# Patient Record
Sex: MALE | Race: OTHER | HISPANIC OR LATINO | URBAN - METROPOLITAN AREA
[De-identification: names, ages, dates, MRNs, and addresses within clinical notes are randomized per-mention and may not be internally consistent; named-entity substitution may affect disease eponyms.]

---

## 2017-07-22 ENCOUNTER — INPATIENT (INPATIENT)
Facility: HOSPITAL | Age: 47
LOS: 1 days | Discharge: HOME | DRG: 313 | End: 2017-07-24
Attending: INTERNAL MEDICINE | Admitting: INTERNAL MEDICINE
Payer: COMMERCIAL

## 2017-07-22 VITALS
OXYGEN SATURATION: 98 % | TEMPERATURE: 98 F | HEART RATE: 107 BPM | WEIGHT: 134.92 LBS | HEIGHT: 66 IN | SYSTOLIC BLOOD PRESSURE: 132 MMHG | RESPIRATION RATE: 20 BRPM | DIASTOLIC BLOOD PRESSURE: 92 MMHG

## 2017-07-22 DIAGNOSIS — R07.9 CHEST PAIN, UNSPECIFIED: ICD-10-CM

## 2017-07-22 DIAGNOSIS — Z98.52 VASECTOMY STATUS: Chronic | ICD-10-CM

## 2017-07-22 DIAGNOSIS — Z29.9 ENCOUNTER FOR PROPHYLACTIC MEASURES, UNSPECIFIED: ICD-10-CM

## 2017-07-22 LAB
ACETONE SERPL-MCNC: NEGATIVE — SIGNIFICANT CHANGE UP
ALBUMIN SERPL ELPH-MCNC: 3.6 G/DL — SIGNIFICANT CHANGE UP (ref 3.5–5)
ALP SERPL-CCNC: 89 U/L — SIGNIFICANT CHANGE UP (ref 40–120)
ALT FLD-CCNC: 33 U/L DA — SIGNIFICANT CHANGE UP (ref 10–60)
ANION GAP SERPL CALC-SCNC: 7 MMOL/L — SIGNIFICANT CHANGE UP (ref 5–17)
APPEARANCE UR: CLEAR — SIGNIFICANT CHANGE UP
APTT BLD: 29.3 SEC — SIGNIFICANT CHANGE UP (ref 27.5–37.4)
AST SERPL-CCNC: 24 U/L — SIGNIFICANT CHANGE UP (ref 10–40)
BASOPHILS # BLD AUTO: 0.1 K/UL — SIGNIFICANT CHANGE UP (ref 0–0.2)
BASOPHILS NFR BLD AUTO: 1.5 % — SIGNIFICANT CHANGE UP (ref 0–2)
BILIRUB SERPL-MCNC: 0.5 MG/DL — SIGNIFICANT CHANGE UP (ref 0.2–1.2)
BILIRUB UR-MCNC: NEGATIVE — SIGNIFICANT CHANGE UP
BUN SERPL-MCNC: 10 MG/DL — SIGNIFICANT CHANGE UP (ref 7–18)
CALCIUM SERPL-MCNC: 8.7 MG/DL — SIGNIFICANT CHANGE UP (ref 8.4–10.5)
CHLORIDE SERPL-SCNC: 105 MMOL/L — SIGNIFICANT CHANGE UP (ref 96–108)
CHOLEST SERPL-MCNC: 184 MG/DL — SIGNIFICANT CHANGE UP (ref 10–199)
CK MB BLD-MCNC: 0.7 % — SIGNIFICANT CHANGE UP (ref 0–3.5)
CK MB BLD-MCNC: <0.7 % — SIGNIFICANT CHANGE UP (ref 0–3.5)
CK MB CFR SERPL CALC: 1.1 NG/ML — SIGNIFICANT CHANGE UP (ref 0–3.6)
CK MB CFR SERPL CALC: <1 NG/ML — SIGNIFICANT CHANGE UP (ref 0–3.6)
CK SERPL-CCNC: 140 U/L — SIGNIFICANT CHANGE UP (ref 35–232)
CK SERPL-CCNC: 154 U/L — SIGNIFICANT CHANGE UP (ref 35–232)
CO2 SERPL-SCNC: 28 MMOL/L — SIGNIFICANT CHANGE UP (ref 22–31)
COLOR SPEC: YELLOW — SIGNIFICANT CHANGE UP
CREAT SERPL-MCNC: 1.12 MG/DL — SIGNIFICANT CHANGE UP (ref 0.5–1.3)
D DIMER BLD IA.RAPID-MCNC: <150 NG/ML DDU — SIGNIFICANT CHANGE UP
DIFF PNL FLD: NEGATIVE — SIGNIFICANT CHANGE UP
EOSINOPHIL # BLD AUTO: 0.3 K/UL — SIGNIFICANT CHANGE UP (ref 0–0.5)
EOSINOPHIL NFR BLD AUTO: 3.6 % — SIGNIFICANT CHANGE UP (ref 0–6)
GLUCOSE SERPL-MCNC: 98 MG/DL — SIGNIFICANT CHANGE UP (ref 70–99)
GLUCOSE UR QL: NEGATIVE — SIGNIFICANT CHANGE UP
HCT VFR BLD CALC: 54.2 % — HIGH (ref 39–50)
HDLC SERPL-MCNC: 51 MG/DL — SIGNIFICANT CHANGE UP (ref 40–125)
HGB BLD-MCNC: 17.2 G/DL — HIGH (ref 13–17)
INR BLD: 0.92 RATIO — SIGNIFICANT CHANGE UP (ref 0.88–1.16)
KETONES UR-MCNC: NEGATIVE — SIGNIFICANT CHANGE UP
LEUKOCYTE ESTERASE UR-ACNC: NEGATIVE — SIGNIFICANT CHANGE UP
LIDOCAIN IGE QN: 200 U/L — SIGNIFICANT CHANGE UP (ref 73–393)
LIPID PNL WITH DIRECT LDL SERPL: 75 MG/DL — SIGNIFICANT CHANGE UP
LYMPHOCYTES # BLD AUTO: 2.6 K/UL — SIGNIFICANT CHANGE UP (ref 1–3.3)
LYMPHOCYTES # BLD AUTO: 28 % — SIGNIFICANT CHANGE UP (ref 13–44)
MAGNESIUM SERPL-MCNC: 2.1 MG/DL — SIGNIFICANT CHANGE UP (ref 1.6–2.6)
MCHC RBC-ENTMCNC: 28.4 PG — SIGNIFICANT CHANGE UP (ref 27–34)
MCHC RBC-ENTMCNC: 31.7 GM/DL — LOW (ref 32–36)
MCV RBC AUTO: 89.6 FL — SIGNIFICANT CHANGE UP (ref 80–100)
MONOCYTES # BLD AUTO: 0.8 K/UL — SIGNIFICANT CHANGE UP (ref 0–0.9)
MONOCYTES NFR BLD AUTO: 8.4 % — SIGNIFICANT CHANGE UP (ref 2–14)
NEUTROPHILS # BLD AUTO: 5.4 K/UL — SIGNIFICANT CHANGE UP (ref 1.8–7.4)
NEUTROPHILS NFR BLD AUTO: 58.4 % — SIGNIFICANT CHANGE UP (ref 43–77)
NITRITE UR-MCNC: NEGATIVE — SIGNIFICANT CHANGE UP
NT-PROBNP SERPL-SCNC: 42 PG/ML — SIGNIFICANT CHANGE UP (ref 0–125)
PH UR: 6 — SIGNIFICANT CHANGE UP (ref 5–8)
PLATELET # BLD AUTO: 211 K/UL — SIGNIFICANT CHANGE UP (ref 150–400)
POTASSIUM SERPL-MCNC: 4.3 MMOL/L — SIGNIFICANT CHANGE UP (ref 3.5–5.3)
POTASSIUM SERPL-SCNC: 4.3 MMOL/L — SIGNIFICANT CHANGE UP (ref 3.5–5.3)
PROT SERPL-MCNC: 7.1 G/DL — SIGNIFICANT CHANGE UP (ref 6–8.3)
PROT UR-MCNC: NEGATIVE — SIGNIFICANT CHANGE UP
PROTHROM AB SERPL-ACNC: 10 SEC — SIGNIFICANT CHANGE UP (ref 9.8–12.7)
RBC # BLD: 6.04 M/UL — HIGH (ref 4.2–5.8)
RBC # FLD: 12 % — SIGNIFICANT CHANGE UP (ref 10.3–14.5)
SODIUM SERPL-SCNC: 140 MMOL/L — SIGNIFICANT CHANGE UP (ref 135–145)
SP GR SPEC: 1.01 — SIGNIFICANT CHANGE UP (ref 1.01–1.02)
TOTAL CHOLESTEROL/HDL RATIO MEASUREMENT: 3.6 RATIO — SIGNIFICANT CHANGE UP (ref 3.4–9.6)
TRIGL SERPL-MCNC: 291 MG/DL — HIGH (ref 10–149)
TROPONIN I SERPL-MCNC: <0.015 NG/ML — SIGNIFICANT CHANGE UP (ref 0–0.04)
TROPONIN I SERPL-MCNC: <0.015 NG/ML — SIGNIFICANT CHANGE UP (ref 0–0.04)
TSH SERPL-MCNC: 0.63 UU/ML — SIGNIFICANT CHANGE UP (ref 0.34–4.82)
UROBILINOGEN FLD QL: NEGATIVE — SIGNIFICANT CHANGE UP
WBC # BLD: 9.2 K/UL — SIGNIFICANT CHANGE UP (ref 3.8–10.5)
WBC # FLD AUTO: 9.2 K/UL — SIGNIFICANT CHANGE UP (ref 3.8–10.5)

## 2017-07-22 PROCEDURE — 99285 EMERGENCY DEPT VISIT HI MDM: CPT

## 2017-07-22 PROCEDURE — 71010: CPT | Mod: 26

## 2017-07-22 RX ORDER — SODIUM CHLORIDE 9 MG/ML
3 INJECTION INTRAMUSCULAR; INTRAVENOUS; SUBCUTANEOUS ONCE
Qty: 0 | Refills: 0 | Status: COMPLETED | OUTPATIENT
Start: 2017-07-22 | End: 2017-07-22

## 2017-07-22 RX ORDER — ASPIRIN/CALCIUM CARB/MAGNESIUM 324 MG
162 TABLET ORAL ONCE
Qty: 0 | Refills: 0 | Status: COMPLETED | OUTPATIENT
Start: 2017-07-22 | End: 2017-07-22

## 2017-07-22 RX ORDER — METOPROLOL TARTRATE 50 MG
25 TABLET ORAL
Qty: 0 | Refills: 0 | Status: DISCONTINUED | OUTPATIENT
Start: 2017-07-22 | End: 2017-07-24

## 2017-07-22 RX ORDER — SODIUM CHLORIDE 9 MG/ML
1000 INJECTION INTRAMUSCULAR; INTRAVENOUS; SUBCUTANEOUS
Qty: 0 | Refills: 0 | Status: DISCONTINUED | OUTPATIENT
Start: 2017-07-22 | End: 2017-07-24

## 2017-07-22 RX ORDER — TRAMADOL HYDROCHLORIDE 50 MG/1
25 TABLET ORAL THREE TIMES A DAY
Qty: 0 | Refills: 0 | Status: DISCONTINUED | OUTPATIENT
Start: 2017-07-22 | End: 2017-07-24

## 2017-07-22 RX ORDER — ATORVASTATIN CALCIUM 80 MG/1
40 TABLET, FILM COATED ORAL AT BEDTIME
Qty: 0 | Refills: 0 | Status: DISCONTINUED | OUTPATIENT
Start: 2017-07-22 | End: 2017-07-24

## 2017-07-22 RX ORDER — ASPIRIN/CALCIUM CARB/MAGNESIUM 324 MG
81 TABLET ORAL DAILY
Qty: 0 | Refills: 0 | Status: DISCONTINUED | OUTPATIENT
Start: 2017-07-22 | End: 2017-07-24

## 2017-07-22 RX ORDER — SODIUM CHLORIDE 9 MG/ML
1000 INJECTION INTRAMUSCULAR; INTRAVENOUS; SUBCUTANEOUS
Qty: 0 | Refills: 0 | Status: DISCONTINUED | OUTPATIENT
Start: 2017-07-22 | End: 2017-07-22

## 2017-07-22 RX ORDER — NICOTINE POLACRILEX 2 MG
1 GUM BUCCAL DAILY
Qty: 0 | Refills: 0 | Status: DISCONTINUED | OUTPATIENT
Start: 2017-07-22 | End: 2017-07-24

## 2017-07-22 RX ORDER — ACETAMINOPHEN 500 MG
650 TABLET ORAL EVERY 6 HOURS
Qty: 0 | Refills: 0 | Status: DISCONTINUED | OUTPATIENT
Start: 2017-07-22 | End: 2017-07-24

## 2017-07-22 RX ORDER — PANTOPRAZOLE SODIUM 20 MG/1
40 TABLET, DELAYED RELEASE ORAL
Qty: 0 | Refills: 0 | Status: DISCONTINUED | OUTPATIENT
Start: 2017-07-22 | End: 2017-07-24

## 2017-07-22 RX ADMIN — SODIUM CHLORIDE 3 MILLILITER(S): 9 INJECTION INTRAMUSCULAR; INTRAVENOUS; SUBCUTANEOUS at 14:43

## 2017-07-22 RX ADMIN — Medication 25 MILLIGRAM(S): at 20:39

## 2017-07-22 RX ADMIN — Medication 162 MILLIGRAM(S): at 14:43

## 2017-07-22 RX ADMIN — ATORVASTATIN CALCIUM 40 MILLIGRAM(S): 80 TABLET, FILM COATED ORAL at 20:40

## 2017-07-22 RX ADMIN — SODIUM CHLORIDE 125 MILLILITER(S): 9 INJECTION INTRAMUSCULAR; INTRAVENOUS; SUBCUTANEOUS at 18:14

## 2017-07-22 NOTE — H&P ADULT - ASSESSMENT
47 y.o. male from home, lives with wife and daughter, has never seen a physician in the past and has no PNHx, PSHx of vasectomy, c/o on & off Lt sided chest pressure since   patient has stable labs and vitals in the ED, EKG: NSR, without EKG changes, T1 and D-dimer negative, ProBNp WNL.  patient has never seen a doctor or had a cardiac work-up in the past and was worried this time about the pain being of cardiac etiology and hence came to the ED. patient is being admitted for chest pain, patient being a smoker, is at a risk for ACS and hence will need monitoring.

## 2017-07-22 NOTE — H&P ADULT - NEGATIVE GASTROINTESTINAL SYMPTOMS
no jaundice/no nausea/no constipation/no hematochezia/no steatorrhea/no change in bowel habits/no diarrhea/no melena/no abdominal pain/no vomiting/no flatulence/no hiccoughs
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
## 2017-07-22 NOTE — H&P ADULT - NEGATIVE ENMT SYMPTOMS
no post-nasal discharge/no ear pain/no vertigo/no nasal discharge/no nasal congestion/no nasal obstruction/no gum bleeding/no throat pain/no sinus symptoms/no dry mouth/no recurrent cold sores/no tinnitus/no nose bleeds/no abnormal taste sensation/no dysphagia/no hearing difficulty

## 2017-07-22 NOTE — H&P ADULT - NEUROLOGICAL DETAILS
deep reflexes intact/cranial nerves intact/alert and oriented x 3/sensation intact/responds to pain/responds to verbal commands

## 2017-07-22 NOTE — H&P ADULT - RS GEN PE MLT RESP DETAILS PC
no chest wall tenderness/airway patent/no subcutaneous emphysema/respirations non-labored/airway obstructed/normal/good air movement/no rales/no wheezes/breath sounds equal/no intercostal retractions/no rhonchi/clear to auscultation bilaterally

## 2017-07-22 NOTE — H&P ADULT - GASTROINTESTINAL DETAILS
no rebound tenderness/no masses palpable/no guarding/nontender/no distention/no bruit/no organomegaly/normal/soft/no rigidity/bowel sounds normal

## 2017-07-22 NOTE — ED ADULT NURSE NOTE - ED STAT RN HANDOFF DETAILS 2
received  pt.in bed   at 1910  pt.is  alert and  oriented x3.denies pain.on CM  with  NSR. transfer to  509A report given  to darion davis.not   in  distress

## 2017-07-22 NOTE — H&P ADULT - NEGATIVE CARDIOVASCULAR SYMPTOMS
no paroxysmal nocturnal dyspnea/no palpitations/no peripheral edema/no claudication/no orthopnea/no dyspnea on exertion

## 2017-07-22 NOTE — H&P ADULT - NEGATIVE GENERAL GENITOURINARY SYMPTOMS
normal urinary frequency/no nocturia/no urine discoloration/no incontinence/normal libido/no urinary hesitancy/no flank pain L/no flank pain R/no dysuria/no renal colic/no bladder infections/no hematuria/no gas in urine

## 2017-07-22 NOTE — H&P ADULT - NEGATIVE OPHTHALMOLOGIC SYMPTOMS
no discharge L/no lacrimation R/no loss of vision L/no lacrimation L/no irritation L/no blurred vision R/no loss of vision R/no scleral injection R/no photophobia/no pain R/no irritation R/no blurred vision L/no discharge R/no pain L/no scleral injection L/no diplopia

## 2017-07-22 NOTE — ED PROVIDER NOTE - OBJECTIVE STATEMENT
47 y.o. male has never seen a physician in the past, c/o on & off Lt sided chest pressure since yest., also "pinching" sensation nonradiating, no associated symptoms, pt with problem sleeping for past few mos., no fever, coughing, recent travelling

## 2017-07-22 NOTE — H&P ADULT - NEGATIVE GENERAL SYMPTOMS
no polydipsia/no fever/no polyuria/no weight gain/no malaise/no sweating/no polyphagia/no fatigue/no anorexia/no weight loss/no chills

## 2017-07-22 NOTE — H&P ADULT - NEGATIVE SKIN SYMPTOMS
no itching/no hair loss/no change in size/color of mole/no rash/no pitted nails/no tumor/no dryness/no brittle nails

## 2017-07-22 NOTE — H&P ADULT - PROBLEM SELECTOR PLAN 1
patient with chest pain, non exertional, intermittent on left side of chest , however patient is a current active smoker and ACS needs to be ruled out  More suspicion for MSK chest pain  KATHIA score: 0  Monitor on TELE, ACS protocol, ASA, Statin BB  Will obtain TTE patient with chest pain, non exertional, intermittent on left side of chest , however patient is a current active smoker and ACS needs to be ruled out  More suspicion for MSK chest pain  KATHAI score: 0  Monitor on TELE, ACS protocol, ASA, Statin BB  Will obtain TTE  Cardiology: Dr Ramon patient with chest pain, non exertional, intermittent on left side of chest , however patient is a current active smoker and ACS needs to be ruled out  More suspicion for MSK chest pain  KATHIA score: 0  Monitor on TELE, ACS protocol, ASA, Statin BB  Trend troponins, T1 negative, F/up T2 and T3  Will obtain TTE  Cardiology: Dr Ramon

## 2017-07-22 NOTE — H&P ADULT - NEGATIVE MUSCULOSKELETAL SYMPTOMS
no leg pain L/no arthritis/no muscle weakness/no myalgia/no leg pain R/no joint swelling/no muscle cramps/no back pain/no arthralgia/no arm pain R/no neck pain/no arm pain L/no stiffness

## 2017-07-22 NOTE — H&P ADULT - HISTORY OF PRESENT ILLNESS
47 y.o. male from home, lives with wife and daughter, has never seen a physician in the past and has no PNHx, PSHx of vasectomy, c/o on & off Lt sided chest pressure since yesterday, also "pinching" sensation non radiating, no associated symptoms. patient describes the pain as pressure like, left sided, with ocassional pinching sensation, intermittent, with no exacerbating or relieving factors, pain happens at rest too, even while watching TV. 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 However patient admits that the pain is sometimes worse while stretching his upper body or deep inspiration, denies association with food, denies any symptoms of GERD. Says that he has been unable to sleep adequately for a past few months, however denies any stress at home or work.  Patient is a current smoker, 1 Pack a week for 20 years, drinks beer frequently, specially 10-20 beers on weekends  NKDA  family Hx: CVA in Father and HTN in mother

## 2017-07-22 NOTE — ED PROVIDER NOTE - MEDICAL DECISION MAKING DETAILS
pt with chest pain, h/o smoking, never saw a physician, concern for ACS, will get labs, Trop, admission

## 2017-07-23 LAB
ANION GAP SERPL CALC-SCNC: 7 MMOL/L — SIGNIFICANT CHANGE UP (ref 5–17)
BUN SERPL-MCNC: 7 MG/DL — SIGNIFICANT CHANGE UP (ref 7–18)
CALCIUM SERPL-MCNC: 8.2 MG/DL — LOW (ref 8.4–10.5)
CHLORIDE SERPL-SCNC: 109 MMOL/L — HIGH (ref 96–108)
CK MB CFR SERPL CALC: <1 NG/ML — SIGNIFICANT CHANGE UP (ref 0–3.6)
CK SERPL-CCNC: 135 U/L — SIGNIFICANT CHANGE UP (ref 35–232)
CO2 SERPL-SCNC: 28 MMOL/L — SIGNIFICANT CHANGE UP (ref 22–31)
CREAT SERPL-MCNC: 0.85 MG/DL — SIGNIFICANT CHANGE UP (ref 0.5–1.3)
FOLATE SERPL-MCNC: 12.4 NG/ML — SIGNIFICANT CHANGE UP (ref 4.8–24.2)
GLUCOSE SERPL-MCNC: 89 MG/DL — SIGNIFICANT CHANGE UP (ref 70–99)
HBA1C BLD-MCNC: 5.4 % — SIGNIFICANT CHANGE UP (ref 4–5.6)
HCT VFR BLD CALC: 49.3 % — SIGNIFICANT CHANGE UP (ref 39–50)
HGB BLD-MCNC: 16.3 G/DL — SIGNIFICANT CHANGE UP (ref 13–17)
MAGNESIUM SERPL-MCNC: 2.1 MG/DL — SIGNIFICANT CHANGE UP (ref 1.6–2.6)
MCHC RBC-ENTMCNC: 29.3 PG — SIGNIFICANT CHANGE UP (ref 27–34)
MCHC RBC-ENTMCNC: 33.1 GM/DL — SIGNIFICANT CHANGE UP (ref 32–36)
MCV RBC AUTO: 88.7 FL — SIGNIFICANT CHANGE UP (ref 80–100)
PHOSPHATE SERPL-MCNC: 2.7 MG/DL — SIGNIFICANT CHANGE UP (ref 2.5–4.5)
PLATELET # BLD AUTO: 219 K/UL — SIGNIFICANT CHANGE UP (ref 150–400)
POTASSIUM SERPL-MCNC: 4.5 MMOL/L — SIGNIFICANT CHANGE UP (ref 3.5–5.3)
POTASSIUM SERPL-SCNC: 4.5 MMOL/L — SIGNIFICANT CHANGE UP (ref 3.5–5.3)
RBC # BLD: 5.56 M/UL — SIGNIFICANT CHANGE UP (ref 4.2–5.8)
RBC # FLD: 12.7 % — SIGNIFICANT CHANGE UP (ref 10.3–14.5)
SODIUM SERPL-SCNC: 144 MMOL/L — SIGNIFICANT CHANGE UP (ref 135–145)
TROPONIN I SERPL-MCNC: <0.015 NG/ML — SIGNIFICANT CHANGE UP (ref 0–0.04)
TROPONIN I SERPL-MCNC: <0.015 NG/ML — SIGNIFICANT CHANGE UP (ref 0–0.04)
VIT B12 SERPL-MCNC: 482 PG/ML — SIGNIFICANT CHANGE UP (ref 243–894)
WBC # BLD: 10.6 K/UL — HIGH (ref 3.8–10.5)
WBC # FLD AUTO: 10.6 K/UL — HIGH (ref 3.8–10.5)

## 2017-07-23 RX ADMIN — ATORVASTATIN CALCIUM 40 MILLIGRAM(S): 80 TABLET, FILM COATED ORAL at 21:47

## 2017-07-23 RX ADMIN — SODIUM CHLORIDE 75 MILLILITER(S): 9 INJECTION INTRAMUSCULAR; INTRAVENOUS; SUBCUTANEOUS at 16:50

## 2017-07-23 RX ADMIN — PANTOPRAZOLE SODIUM 40 MILLIGRAM(S): 20 TABLET, DELAYED RELEASE ORAL at 06:02

## 2017-07-23 RX ADMIN — Medication 1 PATCH: at 12:27

## 2017-07-23 RX ADMIN — Medication 81 MILLIGRAM(S): at 12:27

## 2017-07-23 RX ADMIN — Medication 25 MILLIGRAM(S): at 17:00

## 2017-07-23 NOTE — CONSULT NOTE ADULT - SUBJECTIVE AND OBJECTIVE BOX
Time of visit:1400    CHIEF COMPLAINT: Patient is a 47y old  Male who presents with a chief complaint of chest pain (2017 19:37)      HPI:  47 y.o. male from home, lives with wife and daughter, has never seen a physician in the past and has no PNHx, PSHx of vasectomy, c/o on & off Lt sided chest pressure since yesterday, also "pinching" sensation non radiating, no associated symptoms. patient describes the pain as pressure like, left sided, with ocassional pinching sensation, intermittent, with no exacerbating or relieving factors, pain happens at rest too, even while watching TV. 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 However patient admits that the pain is sometimes worse while stretching his upper body or deep inspiration, denies association with food, denies any symptoms of GERD. Says that he has been unable to sleep adequately for a past few months, however denies any stress at home or work.  Patient is a current smoker, 1 Pack a week for 20 years, drinks beer frequently, specially 10-20 beers on weekends  NKDA  family Hx: CVA in Father and HTN in mother (2017 19:37)   Patient seen and examined.     PAST MEDICAL & SURGICAL HISTORY:  No pertinent past medical history  H/O vasectomy      Allergies    No Known Allergies    Intolerances        MEDICATIONS  (STANDING):  nicotine -  14 mG/24Hr(s) Patch 1 patch Transdermal daily  aspirin enteric coated 81 milliGRAM(s) Oral daily  atorvastatin 40 milliGRAM(s) Oral at bedtime  metoprolol 25 milliGRAM(s) Oral two times a day  pantoprazole    Tablet 40 milliGRAM(s) Oral before breakfast  sodium chloride 0.9%. 1000 milliLiter(s) (75 mL/Hr) IV Continuous <Continuous>      MEDICATIONS  (PRN):  acetaminophen   Tablet. 650 milliGRAM(s) Oral every 6 hours PRN Mild Pain (1 - 3)  traMADol 25 milliGRAM(s) Oral three times a day PRN Moderate Pain (4 - 6)   Medications up to date at time of exam.    Medications up to date at time of exam.    FAMILY HISTORY:  No pertinent family history in first degree relatives      SOCIAL HISTORY  Smoking History: [ x  ] smoking/smoke exposure, 1/2 pack per day. second smoke expose for father and mother  Living Condition: [   ] apartment, [ x  ] private house  Work History:   Travel History: denies recent travel  Illicit Substance Use: denies  Alcohol Use: denies    REVIEW OF SYSTEMS:    CONSTITUTIONAL:  denies fevers, chills, sweats, weight loss    HEENT:  denies diplopia or blurred vision, sore throat or runny nose.    CARDIOVASCULAR:  denies pressure, squeezing, tightness, or heaviness about the chest; no palpitations.    RESPIRATORY:  denies SOB, cough, MEHTA, wheezing.    GASTROINTESTINAL:  denies abdominal pain, nausea, vomiting or diarrhea.    GENITOURINARY: denies dysuria, frequency or urgency.    NEUROLOGIC:  denies numbness, tingling, seizures or weakness.    PSYCHIATRIC:  denies disorder of thought or mood.    MSK: denies swelling, redness      PHYSICAL EXAMINATION:    GENERAL: The patient is a well-developed, well-nourished, in no apparent distress.     Vital Signs Last 24 Hrs  T(C): 37.1 (2017 16:07), Max: 37.2 (2017 11:37)  T(F): 98.7 (2017 16:07), Max: 98.9 (2017 11:37)  HR: 89 (2017 16:07) (55 - 89)  BP: 120/79 (2017 16:07) (109/78 - 129/78)  BP(mean): --  RR: 18 (2017 16:07) (17 - 20)  SpO2: 99% (2017 16:07) (98% - 100%)   (if applicable)    Chest Tube (if applicable)    HEENT: Head is normocephalic and atraumatic. Extraocular muscles are intact. Mucous membranes are moist.     NECK: Supple, no palpable adenopathy.    LUNGS: Clear to auscultation, no wheezing, rales, or rhonchi.    HEART: Regular rate and rhythm without murmur.    ABDOMEN: Soft, nontender, and nondistended.  No hepatosplenomegaly is noted.    EXTREMITIES: Without any cyanosis, clubbing, rash, lesions or edema.    NEUROLOGIC: Awake, alert, oriented.     SKIN: Warm, dry, good turgor.      LABS:                        16.3   10.6  )-----------( 219      ( 2017 05:58 )             49.3     0723    144  |  109<H>  |  7   ----------------------------<  89  4.5   |  28  |  0.85    Ca    8.2<L>      2017 05:58  Phos  2.7       Mg     2.1         TPro  7.1  /  Alb  3.6  /  TBili  0.5  /  DBili  x   /  AST  24  /  ALT  33  /  AlkPhos  89  07-22    PT/INR - ( 2017 14:44 )   PT: 10.0 sec;   INR: 0.92 ratio         PTT - ( 2017 14:44 )  PTT:29.3 sec  Urinalysis Basic - ( 2017 15:26 )    Color: Yellow / Appearance: Clear / S.015 / pH: x  Gluc: x / Ketone: Negative  / Bili: Negative / Urobili: Negative   Blood: x / Protein: Negative / Nitrite: Negative   Leuk Esterase: Negative / RBC: x / WBC x   Sq Epi: x / Non Sq Epi: x / Bacteria: x        CARDIAC MARKERS ( 2017 05:58 )  <0.015 ng/mL / x     / x     / x     / <1.0 ng/mL  CARDIAC MARKERS ( 2017 03:00 )  <0.015 ng/mL / x     / 135 U/L / x     / x      CARDIAC MARKERS ( 2017 20:20 )  <0.015 ng/mL / x     / 140 U/L / x     / <1.0 ng/mL  CARDIAC MARKERS ( 2017 14:44 )  <0.015 ng/mL / x     / 154 U/L / x     / 1.1 ng/mL        Serum Pro-Brain Natriuretic Peptide: 42 pg/mL (17 @ 14:44)          MICROBIOLOGY: (if applicable)    RADIOLOGY & ADDITIONAL STUDIES:  EKG:   CXR:  < from: Xray Chest 1 View AP/PA (17 @ 15:04) >  No consolidation or pleural effusion    Heart size within normal limits.      < end of copied text >  ECHO:    IMPRESSION: 47y Male PAST MEDICAL & SURGICAL HISTORY:  No pertinent past medical history  H/O vasectomy  active smoker   p/w   47 y.o. male from home, lives with wife and daughter, has never seen a physician in the past and has no PNHx, PSHx of vasectomy, c/o on & off Lt sided chest pressure since yesterday, also "pinching" sensation non radiating, no associated symptoms. Patient describes the pain as pressure like, left sided, with ocassional pinching sensation, intermittent, with no exacerbating or relieving factors, pain happens at rest too, even while watching TV. 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 He is an active smoker and has second smoke exposure from spouse and wife. Wife also describe witnessed  apneic episodes with snoring, He has symptoms of sleep apnea    Sugg:  -f/u cardiac stress test and 2DECHO report  -advice to stop smoking  -continue nocotine patch  -out pat pul f/u with PFT  -continue meds and tele monitoring

## 2017-07-23 NOTE — CONSULT NOTE ADULT - SUBJECTIVE AND OBJECTIVE BOX
CARDIOLOGY ATTENDING      HISTORY OF PRESENT ILLNESS: He is a pleasant 46 y/o male [facility] active tobacco smoker admitted with atypical chest pain. EKG and enzymes normal so far. He denies high risk features such as syncope or palpitations.    PAST MEDICAL & SURGICAL HISTORY:  No pertinent past medical history  H/O vasectomy    MEDICATIONS  (STANDING):  nicotine -  14 mG/24Hr(s) Patch 1 patch Transdermal daily  aspirin enteric coated 81 milliGRAM(s) Oral daily  atorvastatin 40 milliGRAM(s) Oral at bedtime  metoprolol 25 milliGRAM(s) Oral two times a day  pantoprazole    Tablet 40 milliGRAM(s) Oral before breakfast  sodium chloride 0.9%. 1000 milliLiter(s) (75 mL/Hr) IV Continuous <Continuous>    No Known Allergies    FAMILY HISTORY:  No pertinent family history in first degree relatives    Non-contributary for premature coronary disease or sudden cardiac death    SOCIAL HISTORY:    [ ] Non-smoker  [ x] Smoker  [ ] Alcohol      REVIEW OF SYSTEMS:  [ x]chest pain  [  ]shortness of breath  [  ]palpitations  [  ]syncope  [ ]near syncope [ ]upper extremity weakness   [ ] lower extremity weakness  [  ]diplopia  [  ]altered mental status   [  ]fevers  [ ]chills [ ]nausea  [ ]vomitting  [  ]dysphagia    [ ]abdominal pain  [ ]melena  [ ]BRBPR    [  ]epistaxis  [  ]rash    [ ]lower extremity edema        [ x] All others negative	  [ ] Unable to obtain    PHYSICAL EXAM:  T(C): 37 (07-23-17 @ 08:16), Max: 37 (07-23-17 @ 00:48)  HR: 80 (07-23-17 @ 08:16) (55 - 107)  BP: 118/75 (07-23-17 @ 08:16) (109/78 - 135/82)  RR: 18 (07-23-17 @ 08:16) (17 - 20)  SpO2: 100% (07-23-17 @ 08:16) (98% - 100%)  Wt(kg): --    Appearance: Normal	  HEENT:   Normal oral mucosa, PERRL, EOMI	  Lymphatic: No lymphadenopathy , no edema  Cardiovascular: Normal S1 S2, No JVD, No murmurs , Peripheral pulses palpable 2+ bilaterally  Respiratory: Lungs clear to auscultation, normal effort 	  Gastrointestinal:  Soft, Non-tender, + BS	  Skin: No rashes, No ecchymoses, No cyanosis, warm to touch  Musculoskeletal: Normal range of motion, normal strength  Psychiatry:  Mood & affect appropriate      TELEMETRY: 	  NSR  ECG:  	NSR, normal EKG    Echo: pending  NST: pending  Cath: n/a  	  	  LABS:	 	                          16.3   10.6  )-----------( 219      ( 23 Jul 2017 05:58 )             49.3     07-23    144  |  109<H>  |  7   ----------------------------<  89  4.5   |  28  |  0.85    Ca    8.2<L>      23 Jul 2017 05:58  Phos  2.7     07-23  Mg     2.1     07-23    TPro  7.1  /  Alb  3.6  /  TBili  0.5  /  DBili  x   /  AST  24  /  ALT  33  /  AlkPhos  89  07-22    proBNP: Serum Pro-Brain Natriuretic Peptide: 42 pg/mL (07-22 @ 14:44)    Lipid Profile:   HgA1c: Hemoglobin A1C, Whole Blood: 5.4 % (07-22 @ 22:28)    TSH: Thyroid Stimulating Hormone, Serum: 0.63 uU/mL (07-22 @ 19:39)      ASSESSMENT/PLAN: 	He is a pleasant 46 y/o male [facility] active tobacco smoker admitted with atypical chest pain. EKG and enzymes normal so far. He denies high risk features such as syncope or palpitations.    -given active tobacco smoker would get echo and NST  -tobacco cessation counseling performed    Debby Ospina M.D., [facility]  [phone]

## 2017-07-23 NOTE — PROGRESS NOTE ADULT - SUBJECTIVE AND OBJECTIVE BOX
INITIAL ASSESSMENT PLAN    History of Present Illness:  Chief Complaint/Reason for Admission: chest pain	  History of Present Illness: 	  47 y.o. male from home, lives with wife and daughter, has never seen a physician in the past and has no PNHx, PSHx of vasectomy, c/o on & off Lt sided chest pressure since yesterday, also "pinching" sensation non radiating, no associated symptoms. patient describes the pain as pressure like, left sided, with ocassional pinching sensation, intermittent, with no exacerbating or relieving factors, pain happens at rest too, even while watching TV. 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 However patient admits that the pain is sometimes worse while stretching his upper body or deep inspiration, denies association with food, denies any symptoms of GERD. Says that he has been unable to sleep adequately for a past few months, however denies any stress at home or work.  Patient is a current smoker, 1 Pack a week for 20 years, drinks beer frequently, specially 10-20 beers on weekends  NKDA  family Hx: CVA in Father and HTN in mother    Review of Systems:  · Negative General Symptoms	no fever; no chills; no sweating; no anorexia; no weight loss; no weight gain; no polyphagia; no polyuria; no polydipsia; no malaise; no fatigue	  · Negative Skin Symptoms	no rash; no itching; no dryness; no change in size/color of mole; no tumor; no brittle nails; no pitted nails; no hair loss	  · Negative Ophthalmologic Symptoms	no diplopia; no photophobia; no lacrimation L; no lacrimation R; no blurred vision L; no blurred vision R; no discharge L; no discharge R; no pain L; no pain R; no irritation L; no irritation R; no loss of vision L; no loss of vision R; no scleral injection L; no scleral injection R	  · Negative ENMT Symptoms	no hearing difficulty; no ear pain; no tinnitus; no vertigo; no sinus symptoms; no nasal congestion; no nasal discharge; no nasal obstruction; no post-nasal discharge; no nose bleeds; no recurrent cold sores; no abnormal taste sensation; no gum bleeding; no dry mouth; no throat pain; no dysphagia	  · Negative Respiratory and Thorax Symptoms	no wheezing; no dyspnea; no cough; no hemoptysis	  · Respiratory and Thorax Symptoms	pleuritic chest pain	  · Negative Cardiovascular Symptoms	no palpitations; no dyspnea on exertion; no orthopnea; no paroxysmal nocturnal dyspnea; no peripheral edema; no claudication	  · Cardiovascular Symptoms	chest pain	  · Negative Gastrointestinal Symptoms	no nausea; no vomiting; no diarrhea; no constipation; no change in bowel habits; no flatulence; no abdominal pain; no melena; no hematochezia; no steatorrhea; no jaundice; no hiccoughs	  · Negative General Genitourinary Symptoms	no hematuria; no renal colic; no flank pain L; no flank pain R; no urine discoloration; no gas in urine; no bladder infections; no incontinence; no dysuria; no urinary hesitancy; normal urinary frequency; no nocturia; normal libido	  · Negative Musculoskeletal Symptoms	no arthralgia; no arthritis; no joint swelling; no myalgia; no muscle cramps; no muscle weakness; no stiffness; no neck pain; no arm pain L; no arm pain R; no back pain; no leg pain L; no leg pain R	  · Neurological	negative	  · Psychiatric	negative	  · Hematology/Lymphatics	negative	  · Endocrine	negative	  · Allergic/Immunologic	negative	      Allergies and Intolerances:        Allergies:  	No Known Allergies:     Home Medications:   * Outpatient Medication Status not yet specified    . .    Patient History:   Past Medical History:  No pertinent past medical history.    Past Surgical History:  H/O vasectomy.    Family History:  No pertinent family history in first degree relatives.    Tobacco Screening:  · Core Measure Site	Yes	  · Has the patient used tobacco in the past 30 days?	Yes	  · Tobacco Cessation Education/Counseling	Offered and provided	  · Tobacco Cessation Medication	Offered and patient accepted	    Risk Assessment:   Present on Admission:  Deep Venous Thrombosis	no	  Pulmonary Embolus	no	    Heart Failure:  Does this patient have a history of or has been diagnosed with heart failure? no.    HIV Screen (per [facility] Department of Health, HIV screening must be offered to every individual between ages 13 and 64)	Offered and patient declined	      Physical Exam:  · Constitutional	detailed exam	  · Constitutional Details	well-developed; well-groomed; well-nourished; no distress	  · Eyes	detailed exam	  · Eyes Details	PERRL; EOMI	  · ENMT	No oral lesions; no gross abnormalities	  · Neck	No bruits; no thyromegaly or nodules	  · Breasts	No masses; no nipple discharge	  · Back	No deformity or limitation of movement	  · Respiratory	detailed exam	  · Respiratory Details	normal; airway patent; breath sounds equal; good air movement; respirations non-labored; clear to auscultation bilaterally; no chest wall tenderness; no intercostal retractions; no rales; no rhonchi; no subcutaneous emphysema; no wheezes; airway obstructed	  · Cardiovascular	detailed exam	  · Cardiovascular Details	regular rate and rhythm  no rub  no murmur  normal PMI	  · Gastrointestinal	detailed exam	  · GI Normal	normal; soft; nontender; no distention; no masses palpable; bowel sounds normal; no bruit; no rebound tenderness; no guarding; no rigidity; no organomegaly	  · Genitourinary	detailed exam	  · Genitourinary Details Male	normal external genitalia; no hernia; no discharge	  · Rectal	not examined	  · Extremities	detailed exam	  · Extremities Details	normal; no clubbing; no cyanosis	  · Vascular	detailed exam	  · Carotid Pulse	right normal; left normal	  · Radial Pulse	right normal; left normal	  · Femoral Pulse	right normal; left normal	  · Neurological	detailed exam	  · Neurological Details	alert and oriented x 3; responds to pain; responds to verbal commands; sensation intact; deep reflexes intact; cranial nerves intact	  · Skin	No lesions; no rash	  · Lymph Nodes	No lymphadedenopathy	  · Musculoskeletal	No joint pain, swelling or deformity; no limitation of movement	  · Psychiatric	Affect and characteristics of appearance, verbalizations, behaviors are appropriate	      Laboratory:   General Chemistry:	    22-Jul-2017 14:44, Acetone, Serum	  Acetone, Serum: Negative, [Negative]	    22-Jul-2017 14:44, CKMB Mass Assay	  CKMB Units: 1.1, [0.0 - 3.6 ng/mL]	  CPK Mass Assay %: 0.7, [0.0 - 3.5 %]	    22-Jul-2017 14:44, Comprehensive Metabolic Panel	  Sodium, Serum: 140, [135 - 145 mmol/L]	  Potassium, Serum: 4.3, [3.5 - 5.3 mmol/L]	  Chloride, Serum: 105, [96 - 108 mmol/L]	  Carbon Dioxide, Serum: 28, [22 - 31 mmol/L]	  Anion Gap, Serum: 7, [5 - 17 mmol/L]	  Blood Urea Nitrogen, Serum: 10, [7 - 18 mg/dL]	  Creatinine, Serum: 1.12, [0.50 - 1.30 mg/dL]	  Glucose, Serum: 98, [70 - 99 mg/dL]	  Calcium, Total Serum: 8.7, [8.4 - 10.5 mg/dL]	  Protein Total, Serum: 7.1, [6.0 - 8.3 g/dL]	  Albumin, Serum: 3.6, [3.5 - 5.0 g/dL]	  Bilirubin Total, Serum: 0.5, [0.2 - 1.2 mg/dL]	  Alkaline Phosphatase, Serum: 89, [40 - 120 U/L]	  Aspartate Aminotransferase (AST/SGOT): 24, [10 - 40 U/L]	  Alanine Aminotransferase (ALT/SGPT): 33, [10 - 60 U/L DA]	  eGFR if Non African American: 78, [>=60 mL/min/1.73M2], Interpretative commentThe units for eGFR are ml/min/1.73m2 (normalized body surface area). TheeGFR is calculated from a serum creatinine using the CKD-EPI equation.Other variables required for calculation are race, age and sex. Amongpatients with chronic kidney disease (CKD), the eGFR is useful indetermining the stage of disease according to KDOQI CKD classification.All eGFR results are reported numerically with the followinginterpretation.        GFR                    With                 Without   (ml/min/1.73 m2)    Kidney Damage       Kidney Damage      >= 90                    Stage 1                     Normal      60-89                    Stage 2                     Decreased GFR      30-59     Stage 3                     Stage 3      15-29                    Stage 4                     Stage 4      < 15                      Stage 5                     Stage 5Each stage of CKD assumes that the associated GFR level has been ineffect for at least 3 months. Determination of stages one and two (witheGFR > 59 ml/min/m2) requires estimation of kidney damage for at least 3months as defined by structural or functional abnormalities.Limitations: All estimates of GFR will be less accurate for patients atextremes of muscle mass (including but not limited to frail elderly,critically ill, or cancer patients), those with unusual diets, and thosewith conditions associated with reduced secretion or extrarenalelimination of creatinine. The eGFR equation is not recommended for usein patients with unstable creatinine levels.	  eGFR if : 91, [>=60 mL/min/1.73M2]	    22-Jul-2017 14:44, Creatine Kinase, Serum	  Creatine Kinase, Serum: 154, [35 - 232 U/L]	    22-Jul-2017 14:44, Lipase, Serum	  Lipase, Serum: 200, [73 - 393 U/L]	    22-Jul-2017 14:44, Magnesium, Serum	  Magnesium, Serum: 2.1, [1.6 - 2.6 mg/dL]	    22-Jul-2017 14:44, Serum Pro-Brain Natriuretic Peptide	  Serum Pro-Brain Natriuretic Peptide: 42, [0 - 125 pg/mL], Interpretive guide for NT PRO-BNPRule out Acute CHF < 300 pg/mL (All ages).Rule in Acute CHF (80-90% predictive value) < 50 years old > 450 pg/mL. 50-75 years old > 900 pg/mL. > 75 years old > 1800 pg/mL.	    22-Jul-2017 14:44, Troponin I, Serum	  Troponin I, Serum: <0.015, [0.000 - 0.045 ng/mL], The new reference range for Troponin-I performed on the Siemens AFFiRiS is 0.015-0.045 ng/mL, which includes the 99th percentile of [facility] reference population. Studies have shown that elevated troponinlevels above the 99th percentile cutoff are associated with an increasedrisk for adverse cardiac events, with the risk increasing as troponinlevels increase. As per a joint committee of the American College ofCardiology and  Society of Cardiology, diagnosis of classic MI isbased upon the detection of a rise or fall of cardiac troponin values,with at least one value above the 99th percentile upper reference limit,in the appropriate clinical context.Troponin-I (ng/mL) Interpretation0.00-0.045 Normal range (includes the 99th percentile of a healthyreference population)>0.045 Elevated troponin level indicating increased riskNote: Troponin-I and Troponin-T cannot be used interchangeably in serialmeasurements. Minimally elevated Troponin results should be interpretedin the context of clinical findings and risk factors.	  Coagulation:	    22-Jul-2017 14:44, Activated Partial Thromboplastin Time	  Activated Partial Thromboplastin Time: 29.3, [27.5 - 37.4 sec], The recommended therapeutic heparin range (full dose) is 58-99 seconds.Recommended therapeutic Argatroban range is 1.5 to 3.0 times the baselineAPTT value, not to exceed 100 seconds. Recommended therapeutic Refludanrange is 1.5 to 2.5 times thebaseline APTT.	    22-Jul-2017 14:44, D-Dimer Assay, Quantitative	  D-Dimer Assay, Quantitative: <150, [ - <=229 ng/mL DDU], D-Dimer result less than 230 ng/mL DDU correlates with the absence ofthrombosis in a patient with a low and moderate     pre-test probability of thrombosis.  1 DDU is approximately equal to2 ng/mL FEU (previous units).	    22-Jul-2017 14:44, Prothrombin Time and INR, Plasma	  Prothrombin Time, Plasma: 10.0, [9.8 - 12.7 sec], Effective March 21st, the reference range for PT has changed.	  INR: 0.92, [0.88 - 1.16 ratio], Please note: New Critical Value: 5.0 ratio as of 1/2/14.	  Hematology:	    22-Jul-2017 14:44, Complete Blood Count + Automated Diff	  WBC Count: 9.2, [3.8 - 10.5 K/uL]	  RBC Count:    6.04, [4.20 - 5.80 M/uL]	  Hemoglobin:    17.2, [13.0 - 17.0 g/dL]	  Hematocrit:    54.2, [39.0 - 50.0 %]	  Mean Cell Volume: 89.6, [80.0 - 100.0 fl]	  Mean Cell Hemoglobin: 28.4, [27.0 - 34.0 pg]	  Mean Cell Hemoglobin Conc:    31.7, [32.0 - 36.0 gm/dL]	  Red Cell Distrib Width: 12.0, [10.3 - 14.5 %]	  Platelet Count - Automated: 211, [150 - 400 K/uL]	  Auto Neutrophil #: 5.4, [1.8 - 7.4 K/uL]	  Auto Lymphocyte #: 2.6, [1.0 - 3.3 K/uL]	  Auto Monocyte #: 0.8, [0.0 - 0.9 K/uL]	  Auto Eosinophil #: 0.3, [0.0 - 0.5 K/uL]	  Auto Basophil #: 0.1, [0.0 - 0.2 K/uL]	  Auto Neutrophil %: 58.4, [43.0 - 77.0 %], Differential percentages must be correlated with absolute numbers forclinical significance.	  Auto Lymphocyte %: 28.0, [13.0 - 44.0 %]	  Auto Monocyte %: 8.4, [2.0 - 14.0 %]	  Auto Eosinophil %: 3.6, [0.0 - 6.0 %]	  Auto Basophil %: 1.5, [0.0 - 2.0 %]	  Urine:	    22-Jul-2017 15:26, Urinalysis	  Color: Yellow, [Yellow]	  Urine Appearance: Clear, [Clear]	  Bilirubin: Negative, [Negative]	  Ketone - Urine: Negative, [Negative]	  Specific Gravity: 1.015, [1.010 - 1.025]	  Protein, Urine: Negative, [Negative]	  Urobilinogen: Negative, [Negative]	  Nitrite: Negative, [Negative]	  Leukocyte Esterase Concentration: Negative, [Negative]	  Blood, Urine: Negative, [Negative]	  Glucose Qualitative, Urine: Negative, [Negative]	  pH Urine: 6.0, [5.0 - 8.0]	    Radiology:   X-Ray, Fluoroscopy:	    22-Jul-2017 15:04, Xray Chest 1 View AP/PA	  PACS Image: Image(s) Available	  Xray Chest 1 View AP/PA: EXAM:  CHEST SINGLE AP OR PA                      PROCEDURE DATE:  07/22/2017  INTERPRETATION:  CLINICAL STATEMENT: Chest pain.TECHNIQUE: AP view of the chest.COMPARISON: None available.FINDINGS/IMPRESSION:No consolidation or pleural effusionHeart size within normal limits.PHU HERNÁNDEZ M.D., ATTENDING RADIOLOGISTThis document has been electronically signed. Jul 22 2017  3:07PM	    Assessment and Plan:   Assessment:  · Assessment		  47 y.o. male from home, lives with wife and daughter, has never seen a physician in the past and has no PNHx, PSHx of vasectomy, c/o on & off Lt sided chest pressure since   patient has stable labs and vitals in the ED, EKG: NSR, without EKG changes, T1 and D-dimer negative, ProBNp WNL.  patient has never seen a doctor or had a cardiac work-up in the past and was worried this time about the pain being of cardiac etiology and hence came to the ED. patient is being admitted for chest pain, patient being a smoker, is at a risk for ACS and hence will need monitoring.      Problem/Plan - 1:  ·  Problem: Chest pain.  Plan: patient with chest pain, non exertional, intermittent on left side of chest , however patient is a current active smoker and ACS needs to be ruled out  More suspicion for MSK chest pain  KATHIA score: 0  Monitor on TELE, ACS protocol, ASA, Statin BB  Will obtain TTE  Cardiology: Dr Lino BENNETT.     Problem/Plan - 2:  ·  Problem: Prophylactic measure.  Plan: IMPROVE VTE score: 0  No DVT PPX needed.

## 2017-07-24 VITALS
TEMPERATURE: 99 F | HEART RATE: 83 BPM | RESPIRATION RATE: 17 BRPM | DIASTOLIC BLOOD PRESSURE: 75 MMHG | SYSTOLIC BLOOD PRESSURE: 110 MMHG | OXYGEN SATURATION: 100 %

## 2017-07-24 LAB
24R-OH-CALCIDIOL SERPL-MCNC: 22 NG/ML — LOW (ref 30–100)
ANION GAP SERPL CALC-SCNC: 8 MMOL/L — SIGNIFICANT CHANGE UP (ref 5–17)
BUN SERPL-MCNC: 10 MG/DL — SIGNIFICANT CHANGE UP (ref 7–18)
CALCIUM SERPL-MCNC: 8.6 MG/DL — SIGNIFICANT CHANGE UP (ref 8.4–10.5)
CHLORIDE SERPL-SCNC: 106 MMOL/L — SIGNIFICANT CHANGE UP (ref 96–108)
CO2 SERPL-SCNC: 28 MMOL/L — SIGNIFICANT CHANGE UP (ref 22–31)
CREAT SERPL-MCNC: 0.98 MG/DL — SIGNIFICANT CHANGE UP (ref 0.5–1.3)
GLUCOSE SERPL-MCNC: 88 MG/DL — SIGNIFICANT CHANGE UP (ref 70–99)
HCT VFR BLD CALC: 50.5 % — HIGH (ref 39–50)
HGB BLD-MCNC: 16.7 G/DL — SIGNIFICANT CHANGE UP (ref 13–17)
MAGNESIUM SERPL-MCNC: 2.1 MG/DL — SIGNIFICANT CHANGE UP (ref 1.6–2.6)
MCHC RBC-ENTMCNC: 29.3 PG — SIGNIFICANT CHANGE UP (ref 27–34)
MCHC RBC-ENTMCNC: 33.1 GM/DL — SIGNIFICANT CHANGE UP (ref 32–36)
MCV RBC AUTO: 88.5 FL — SIGNIFICANT CHANGE UP (ref 80–100)
PHOSPHATE SERPL-MCNC: 2.9 MG/DL — SIGNIFICANT CHANGE UP (ref 2.5–4.5)
PLATELET # BLD AUTO: 231 K/UL — SIGNIFICANT CHANGE UP (ref 150–400)
POTASSIUM SERPL-MCNC: 3.9 MMOL/L — SIGNIFICANT CHANGE UP (ref 3.5–5.3)
POTASSIUM SERPL-SCNC: 3.9 MMOL/L — SIGNIFICANT CHANGE UP (ref 3.5–5.3)
RBC # BLD: 5.71 M/UL — SIGNIFICANT CHANGE UP (ref 4.2–5.8)
RBC # FLD: 12.6 % — SIGNIFICANT CHANGE UP (ref 10.3–14.5)
SODIUM SERPL-SCNC: 142 MMOL/L — SIGNIFICANT CHANGE UP (ref 135–145)
WBC # BLD: 9 K/UL — SIGNIFICANT CHANGE UP (ref 3.8–10.5)
WBC # FLD AUTO: 9 K/UL — SIGNIFICANT CHANGE UP (ref 3.8–10.5)

## 2017-07-24 PROCEDURE — 81003 URINALYSIS AUTO W/O SCOPE: CPT

## 2017-07-24 PROCEDURE — 93306 TTE W/DOPPLER COMPLETE: CPT

## 2017-07-24 PROCEDURE — 85379 FIBRIN DEGRADATION QUANT: CPT

## 2017-07-24 PROCEDURE — 82746 ASSAY OF FOLIC ACID SERUM: CPT

## 2017-07-24 PROCEDURE — 83690 ASSAY OF LIPASE: CPT

## 2017-07-24 PROCEDURE — 85610 PROTHROMBIN TIME: CPT

## 2017-07-24 PROCEDURE — 85730 THROMBOPLASTIN TIME PARTIAL: CPT

## 2017-07-24 PROCEDURE — 80061 LIPID PANEL: CPT

## 2017-07-24 PROCEDURE — 82550 ASSAY OF CK (CPK): CPT

## 2017-07-24 PROCEDURE — 82607 VITAMIN B-12: CPT

## 2017-07-24 PROCEDURE — 84484 ASSAY OF TROPONIN QUANT: CPT

## 2017-07-24 PROCEDURE — 80048 BASIC METABOLIC PNL TOTAL CA: CPT

## 2017-07-24 PROCEDURE — 82009 KETONE BODYS QUAL: CPT

## 2017-07-24 PROCEDURE — 83880 ASSAY OF NATRIURETIC PEPTIDE: CPT

## 2017-07-24 PROCEDURE — 99285 EMERGENCY DEPT VISIT HI MDM: CPT | Mod: 25

## 2017-07-24 PROCEDURE — 84100 ASSAY OF PHOSPHORUS: CPT

## 2017-07-24 PROCEDURE — 84443 ASSAY THYROID STIM HORMONE: CPT

## 2017-07-24 PROCEDURE — 36415 COLL VENOUS BLD VENIPUNCTURE: CPT

## 2017-07-24 PROCEDURE — 82306 VITAMIN D 25 HYDROXY: CPT

## 2017-07-24 PROCEDURE — 85027 COMPLETE CBC AUTOMATED: CPT

## 2017-07-24 PROCEDURE — G0378: CPT

## 2017-07-24 PROCEDURE — 78452 HT MUSCLE IMAGE SPECT MULT: CPT

## 2017-07-24 PROCEDURE — A9502: CPT

## 2017-07-24 PROCEDURE — 83735 ASSAY OF MAGNESIUM: CPT

## 2017-07-24 PROCEDURE — 83036 HEMOGLOBIN GLYCOSYLATED A1C: CPT

## 2017-07-24 PROCEDURE — 80053 COMPREHEN METABOLIC PANEL: CPT

## 2017-07-24 PROCEDURE — 93017 CV STRESS TEST TRACING ONLY: CPT

## 2017-07-24 PROCEDURE — 82553 CREATINE MB FRACTION: CPT

## 2017-07-24 PROCEDURE — 93005 ELECTROCARDIOGRAM TRACING: CPT

## 2017-07-24 PROCEDURE — 71045 X-RAY EXAM CHEST 1 VIEW: CPT

## 2017-07-24 RX ORDER — CHOLECALCIFEROL (VITAMIN D3) 125 MCG
1000 CAPSULE ORAL DAILY
Qty: 0 | Refills: 0 | Status: DISCONTINUED | OUTPATIENT
Start: 2017-07-24 | End: 2017-07-24

## 2017-07-24 RX ORDER — ATORVASTATIN CALCIUM 80 MG/1
1 TABLET, FILM COATED ORAL
Qty: 30 | Refills: 0 | OUTPATIENT
Start: 2017-07-24 | End: 2017-08-23

## 2017-07-24 RX ORDER — NICOTINE POLACRILEX 2 MG
1 GUM BUCCAL
Qty: 1 | Refills: 0 | OUTPATIENT
Start: 2017-07-24 | End: 2017-08-08

## 2017-07-24 RX ORDER — ASPIRIN/CALCIUM CARB/MAGNESIUM 324 MG
1 TABLET ORAL
Qty: 30 | Refills: 0 | OUTPATIENT
Start: 2017-07-24 | End: 2017-08-23

## 2017-07-24 RX ADMIN — Medication 1 PATCH: at 12:01

## 2017-07-24 RX ADMIN — Medication 1000 UNIT(S): at 13:32

## 2017-07-24 RX ADMIN — Medication 1 PATCH: at 12:03

## 2017-07-24 RX ADMIN — Medication 81 MILLIGRAM(S): at 12:01

## 2017-07-24 RX ADMIN — PANTOPRAZOLE SODIUM 40 MILLIGRAM(S): 20 TABLET, DELAYED RELEASE ORAL at 06:33

## 2017-07-24 NOTE — DISCHARGE NOTE ADULT - PATIENT PORTAL LINK FT
“You can access the [employer] Patient Portal, offered by [facility], by registering with the following website: [email or web address]”

## 2017-07-24 NOTE — DISCHARGE NOTE ADULT - MEDICATION SUMMARY - MEDICATIONS TO TAKE
I will START or STAY ON the medications listed below when I get home from the hospital:    aspirin 81 mg oral delayed release tablet  -- 1 tab(s) by mouth once a day  -- Indication: For Cardio protective    atorvastatin 40 mg oral tablet  -- 1 tab(s) by mouth once a day (at bedtime)  -- Indication: For HLD    nicotine 14 mg/24 hr transdermal film, extended release  -- 1 dose(s) by transdermal patch once a day  -- Indication: For SMOKING CESSATION

## 2017-07-24 NOTE — DISCHARGE NOTE ADULT - CARE PLAN
Principal Discharge DX:	Chest pain  Goal:	Relieved  Instructions for follow-up, activity and diet:	Probably it is Musculoskeletal, as cardiac cause is ruled out. Your Stress Test and Echo of heart is normal. Please follow up with Dr. Stein Cardiologist in 2 weeks. Take aspirin 81mg once daily.  Secondary Diagnosis:	Smoking addiction  Instructions for follow-up, activity and diet:	As discussed please quit smoking. Use Nicotine patch 1 transdermal patch when you get smoking craving.  Secondary Diagnosis:	Hyperlipemia  Instructions for follow-up, activity and diet:	Please take atorvastatin 40mg oral at bedtime. Principal Discharge DX:	Chest pain  Goal:	Relieved  Instructions for follow-up, activity and diet:	Probably it is Musculoskeletal, as cardiac cause is ruled out. Your Stress Test and Echo of heart is normal. Please follow up with Dr. Stein Cardiologist in 2 weeks. Take aspirin 81mg once daily.  Secondary Diagnosis:	Smoking addiction  Instructions for follow-up, activity and diet:	As discussed please quit smoking. Use Nicotine patch 1 transdermal patch when you get smoking craving.  Secondary Diagnosis:	Hyperlipemia  Instructions for follow-up, activity and diet:	Please take atorvastatin 40mg oral at bedtime. Adhere to low cholesterol diet as your Triglycerides are high

## 2017-07-24 NOTE — PROGRESS NOTE ADULT - SUBJECTIVE AND OBJECTIVE BOX
Subjective:  pt seen and examined , ROS neg      nicotine -  14 mG/24Hr(s) Patch 1 patch Transdermal daily  aspirin enteric coated 81 milliGRAM(s) Oral daily  atorvastatin 40 milliGRAM(s) Oral at bedtime  metoprolol 25 milliGRAM(s) Oral two times a day  acetaminophen   Tablet. 650 milliGRAM(s) Oral every 6 hours PRN  traMADol 25 milliGRAM(s) Oral three times a day PRN  pantoprazole    Tablet 40 milliGRAM(s) Oral before breakfast  sodium chloride 0.9%. 1000 milliLiter(s) IV Continuous <Continuous>                            16.3   10.6  )-----------( 219      ( 2017 05:58 )             49.3       Hemoglobin: 16.3 g/dL ( @ 05:58)  Hemoglobin: 17.2 g/dL ( @ 14:44)          144  |  109<H>  |  7   ----------------------------<  89  4.5   |  28  |  0.85    Ca    8.2<L>      2017 05:58  Phos  2.7       Mg     2.1         TPro  7.1  /  Alb  3.6  /  TBili  0.5  /  DBili  x   /  AST  24  /  ALT  33  /  AlkPhos  89      Creatinine Trend: 0.85<--, 1.12<--    COAGS:     CARDIAC MARKERS ( 2017 05:58 )  <0.015 ng/mL / x     / x     / x     / <1.0 ng/mL  CARDIAC MARKERS ( 2017 03:00 )  <0.015 ng/mL / x     / 135 U/L / x     / x      CARDIAC MARKERS ( 2017 20:20 )  <0.015 ng/mL / x     / 140 U/L / x     / <1.0 ng/mL  CARDIAC MARKERS ( 2017 14:44 )  <0.015 ng/mL / x     / 154 U/L / x     / 1.1 ng/mL        T(C): 36.7 (17 @ 05:00), Max: 37.3 (17 @ 20:39)  HR: 85 (17 @ 05:00) (74 - 89)  BP: 104/68 (17 @ 05:00) (104/68 - 122/76)  RR: 18 (17 @ 05:00) (18 - 18)  SpO2: 99% (17 @ 05:00) (99% - 100%)  Wt(kg): --    I&O's Summary    2017 07  -  2017 07:00  --------------------------------------------------------  IN: 450 mL / OUT: 0 mL / NET: 450 mL      -  2017 06:33  --------------------------------------------------------  IN: 750 mL / OUT: 0 mL / NET: 750 mL        Daily     Daily Weight in k.2 (2017 05:00)    Appearance: Normal	  HEENT:   Normal oral mucosa, PERRL, EOMI	  Lymphatic: No lymphadenopathy , no edema  Cardiovascular: Normal S1 S2, No JVD, No murmurs , Peripheral pulses palpable 2+ bilaterally  Respiratory: Lungs clear to auscultation, normal effort 	  Gastrointestinal:  Soft, Non-tender, + BS	  Skin: No rashes, No ecchymoses, No cyanosis, warm to touch  Musculoskeletal: Normal range of motion, normal strength  Psychiatry:  Mood & affect appropriate    TELEMETRY: NSR	      DIAGNOSTIC TESTING:  [ ] Echocardiogram:  [ ]  Catheterization:  [ ] Stress Test:    OTHER: 	        ASSESSMENT/PLAN: 	47y Male PMH active tobacco smoker admitted with atypical chest pain. EKG and enzymes normal so far. He denies high risk features such as syncope or palpitations.    Cardiac marker neg x 3  echo pending  NSt pending  ASA, stain , BB   tele stable , no arrythmia  smoking cessation , nicotine patch is on    D/W Dr Stein   no s/s of chf on exam  further plan post above.

## 2017-07-24 NOTE — PROGRESS NOTE ADULT - SUBJECTIVE AND OBJECTIVE BOX
Time of Visit: 1100  Patient seen and examined.     MEDICATIONS  (STANDING):  nicotine -  14 mG/24Hr(s) Patch 1 patch Transdermal daily  aspirin enteric coated 81 milliGRAM(s) Oral daily  atorvastatin 40 milliGRAM(s) Oral at bedtime  metoprolol 25 milliGRAM(s) Oral two times a day  pantoprazole    Tablet 40 milliGRAM(s) Oral before breakfast  sodium chloride 0.9%. 1000 milliLiter(s) (75 mL/Hr) IV Continuous <Continuous>  cholecalciferol 1000 Unit(s) Oral daily      MEDICATIONS  (PRN):  acetaminophen   Tablet. 650 milliGRAM(s) Oral every 6 hours PRN Mild Pain (1 - 3)  traMADol 25 milliGRAM(s) Oral three times a day PRN Moderate Pain (4 - 6)     Medications up to date at time of exam.    PHYSICAL EXAMINATION:  Patient has no new complaints.  GENERAL: The patient is a well-developed, well-nourished, in no apparent distress.     Vital Signs Last 24 Hrs  T(C): 36.8 (2017 11:11), Max: 37.3 (2017 20:39)  T(F): 98.2 (2017 11:11), Max: 99.1 (2017 20:39)  HR: 82 (2017 11:11) (73 - 89)  BP: 115/75 (2017 11:11) (104/68 - 122/76)  BP(mean): --  RR: 18 (2017 11:11) (18 - 18)  SpO2: 98% (2017 11:11) (98% - 100%)   (if applicable)    Chest Tube (if applicable)    HEENT: Head is normocephalic and atraumatic.     NECK: Supple, no palpable adenopathy.    LUNGS: Clear to auscultation, no wheezing, rales, or rhonchi.    HEART: Regular rate and rhythm without murmur.    ABDOMEN: Soft, nontender, and nondistended.      EXTREMITIES: Without any cyanosis, clubbing, rash, lesions or edema.    NEUROLOGIC: Awake, alert.    SKIN: Warm, dry, good turgor.    LABS:                        16.7   9.0   )-----------( 231      ( 2017 07:44 )             50.5     07-24    142  |  106  |  10  ----------------------------<  88  3.9   |  28  |  0.98    Ca    8.6      2017 07:44  Phos  2.9       Mg     2.1     -    TPro  7.1  /  Alb  3.6  /  TBili  0.5  /  DBili  x   /  AST  24  /  ALT  33  /  AlkPhos  89  -    PT/INR - ( 2017 14:44 )   PT: 10.0 sec;   INR: 0.92 ratio         PTT - ( 2017 14:44 )  PTT:29.3 sec  Urinalysis Basic - ( 2017 15:26 )    Color: Yellow / Appearance: Clear / S.015 / pH: x  Gluc: x / Ketone: Negative  / Bili: Negative / Urobili: Negative   Blood: x / Protein: Negative / Nitrite: Negative   Leuk Esterase: Negative / RBC: x / WBC x   Sq Epi: x / Non Sq Epi: x / Bacteria: x        CARDIAC MARKERS ( 2017 05:58 )  <0.015 ng/mL / x     / x     / x     / <1.0 ng/mL  CARDIAC MARKERS ( 2017 03:00 )  <0.015 ng/mL / x     / 135 U/L / x     / x      CARDIAC MARKERS ( 2017 20:20 )  <0.015 ng/mL / x     / 140 U/L / x     / <1.0 ng/mL  CARDIAC MARKERS ( 2017 14:44 )  <0.015 ng/mL / x     / 154 U/L / x     / 1.1 ng/mL        Serum Pro-Brain Natriuretic Peptide: 42 pg/mL (17 @ 14:44)          MICROBIOLOGY: (if applicable)    RADIOLOGY & ADDITIONAL STUDIES:  EKG:   CXR:  ECHO:  Time of Visit:  Patient seen and examined.     MEDICATIONS  (STANDING):  nicotine -  14 mG/24Hr(s) Patch 1 patch Transdermal daily  aspirin enteric coated 81 milliGRAM(s) Oral daily  atorvastatin 40 milliGRAM(s) Oral at bedtime  metoprolol 25 milliGRAM(s) Oral two times a day  pantoprazole    Tablet 40 milliGRAM(s) Oral before breakfast  sodium chloride 0.9%. 1000 milliLiter(s) (75 mL/Hr) IV Continuous <Continuous>  cholecalciferol 1000 Unit(s) Oral daily      MEDICATIONS  (PRN):  acetaminophen   Tablet. 650 milliGRAM(s) Oral every 6 hours PRN Mild Pain (1 - 3)  traMADol 25 milliGRAM(s) Oral three times a day PRN Moderate Pain (4 - 6)     Medications up to date at time of exam.    PHYSICAL EXAMINATION:  Patient has no new complaints.  GENERAL: The patient is a well-developed, well-nourished, in no apparent distress.     Vital Signs Last 24 Hrs  T(C): 36.8 (2017 11:11), Max: 37.3 (2017 20:39)  T(F): 98.2 (2017 11:11), Max: 99.1 (2017 20:39)  HR: 82 (2017 11:11) (73 - 89)  BP: 115/75 (2017 11:11) (104/68 - 122/76)  BP(mean): --  RR: 18 (2017 11:11) (18 - 18)  SpO2: 98% (2017 11:11) (98% - 100%)   (if applicable)    Chest Tube (if applicable)    HEENT: Head is normocephalic and atraumatic.     NECK: Supple, no palpable adenopathy.    LUNGS: Clear to auscultation, no wheezing, rales, or rhonchi.    HEART: Regular rate and rhythm without murmur.    ABDOMEN: Soft, nontender, and nondistended.      EXTREMITIES: Without any cyanosis, clubbing, rash, lesions or edema.    NEUROLOGIC: Awake, alert.    SKIN: Warm, dry, good turgor.    LABS:                        16.7   9.0   )-----------( 231      ( 2017 07:44 )             50.5     07-24    142  |  106  |  10  ----------------------------<  88  3.9   |  28  |  0.98    Ca    8.6      2017 07:44  Phos  2.9     07-24  Mg     2.1     07-24    TPro  7.1  /  Alb  3.6  /  TBili  0.5  /  DBili  x   /  AST  24  /  ALT  33  /  AlkPhos  89  07-22    PT/INR - ( 2017 14:44 )   PT: 10.0 sec;   INR: 0.92 ratio         PTT - ( 2017 14:44 )  PTT:29.3 sec  Urinalysis Basic - ( 2017 15:26 )    Color: Yellow / Appearance: Clear / S.015 / pH: x  Gluc: x / Ketone: Negative  / Bili: Negative / Urobili: Negative   Blood: x / Protein: Negative / Nitrite: Negative   Leuk Esterase: Negative / RBC: x / WBC x   Sq Epi: x / Non Sq Epi: x / Bacteria: x        CARDIAC MARKERS ( 2017 05:58 )  <0.015 ng/mL / x     / x     / x     / <1.0 ng/mL  CARDIAC MARKERS ( 2017 03:00 )  <0.015 ng/mL / x     / 135 U/L / x     / x      CARDIAC MARKERS ( 2017 20:20 )  <0.015 ng/mL / x     / 140 U/L / x     / <1.0 ng/mL  CARDIAC MARKERS ( 2017 14:44 )  <0.015 ng/mL / x     / 154 U/L / x     / 1.1 ng/mL        Serum Pro-Brain Natriuretic Peptide: 42 pg/mL (17 @ 14:44)          MICROBIOLOGY: (if applicable)    RADIOLOGY & ADDITIONAL STUDIES:  EKG:   CXR:  ECHO:  < from: Transthoracic Echocardiogram (17 @ 07:17) >  CONCLUSIONS:  1. Normal mitral valve.  2. Normal trileaflet aortic valve.  3. Normal aortic root.  4. Normal left atrium.  5. Normal left ventricular internal dimensions and wall  thicknesses.  6. Normal Left Ventricular Systolic Function,  (EF = 55 to  60%)  7. Grade I diastolic dysfunction  8. Normal right atrium.  9. Normal right ventricular size and function.  10. RVS Pressure is 24 mm Hg.  11. Normal tricuspid valve.  12. Normal pulmonic valve.  13. Normal pericardium with no pericardial effusion.    < end of copied text >    IMPRESSION: 47y Male PAST MEDICAL & SURGICAL HISTORY:  No pertinent past medical history  H/O vasectomy     STRESS TEST:   < from: Nuclear Stress Test-Exercise (17 @ 08:47) >  IMPRESSIONS:Normal Study  * Exercise capacity:10 METS, Fair for age and gender.  * Chest Pain: No chest pain with exercise.  * HR Response: Appropriate.  * BP Response: Appropriate.  * Negative ECG evidence of ischemia during exercise stress  test.  * Myocardial Perfusion SPECT results are normal at 98 % of  MPHR.  * No clear evidence of ischemia or infarct.  * Post-stress resting myocardial perfusion gated SPECT  imaging was performed (LVEF = 60 %) with no regional wall  motion abnormalities.    < end of copied text >    p/w     RECOMMENDATIONS:      IMPRESSION: 47y Male PAST MEDICAL & SURGICAL HISTORY:  No pertinent past medical history  H/O vasectomy       p/w     47 y.o. male from home, lives with wife and daughter, has never seen a physician in the past and has no PNHx, PSHx of vasectomy, c/o on & off Lt sided chest pressure since yesterday, also "pinching" sensation non radiating, no associated symptoms. Patient describes the pain as pressure like, left sided, with ocassional pinching sensation, intermittent, with no exacerbating or relieving factors, pain happens at rest too, even while watching TV. 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 He is an active smoker and has second smoke exposure from spouse and wife. Wife also describe witnessed  apneic episodes with snoring, He has symptoms of sleep apnea    RECOMMENDATIONS:     - echo and ST results as above   - cigarette cessation discussed with patient.    - out patient PFT   - out pt pulm f/u   - DVT and GI prophylaxis. Time of Visit: 1100  Patient seen and examined.     MEDICATIONS  (STANDING):  nicotine -  14 mG/24Hr(s) Patch 1 patch Transdermal daily  aspirin enteric coated 81 milliGRAM(s) Oral daily  atorvastatin 40 milliGRAM(s) Oral at bedtime  metoprolol 25 milliGRAM(s) Oral two times a day  pantoprazole    Tablet 40 milliGRAM(s) Oral before breakfast  sodium chloride 0.9%. 1000 milliLiter(s) (75 mL/Hr) IV Continuous <Continuous>  cholecalciferol 1000 Unit(s) Oral daily      MEDICATIONS  (PRN):  acetaminophen   Tablet. 650 milliGRAM(s) Oral every 6 hours PRN Mild Pain (1 - 3)  traMADol 25 milliGRAM(s) Oral three times a day PRN Moderate Pain (4 - 6)     Medications up to date at time of exam.    PHYSICAL EXAMINATION:  Patient has no new complaints.  GENERAL: The patient is a well-developed, well-nourished, in no apparent distress.     Vital Signs Last 24 Hrs  T(C): 36.8 (2017 11:11), Max: 37.3 (2017 20:39)  T(F): 98.2 (2017 11:11), Max: 99.1 (2017 20:39)  HR: 82 (2017 11:11) (73 - 89)  BP: 115/75 (2017 11:11) (104/68 - 122/76)  BP(mean): --  RR: 18 (2017 11:11) (18 - 18)  SpO2: 98% (2017 11:11) (98% - 100%)   (if applicable)    Chest Tube (if applicable)    HEENT: Head is normocephalic and atraumatic.     NECK: Supple, no palpable adenopathy.    LUNGS: Clear to auscultation, no wheezing, rales, or rhonchi.    HEART: Regular rate and rhythm without murmur.    ABDOMEN: Soft, nontender, and nondistended.      EXTREMITIES: Without any cyanosis, clubbing, rash, lesions or edema.    NEUROLOGIC: Awake, alert.    SKIN: Warm, dry, good turgor.    LABS:                        16.7   9.0   )-----------( 231      ( 2017 07:44 )             50.5     07-24    142  |  106  |  10  ----------------------------<  88  3.9   |  28  |  0.98    Ca    8.6      2017 07:44  Phos  2.9       Mg     2.1     -    TPro  7.1  /  Alb  3.6  /  TBili  0.5  /  DBili  x   /  AST  24  /  ALT  33  /  AlkPhos  89  -    PT/INR - ( 2017 14:44 )   PT: 10.0 sec;   INR: 0.92 ratio         PTT - ( 2017 14:44 )  PTT:29.3 sec  Urinalysis Basic - ( 2017 15:26 )    Color: Yellow / Appearance: Clear / S.015 / pH: x  Gluc: x / Ketone: Negative  / Bili: Negative / Urobili: Negative   Blood: x / Protein: Negative / Nitrite: Negative   Leuk Esterase: Negative / RBC: x / WBC x   Sq Epi: x / Non Sq Epi: x / Bacteria: x        CARDIAC MARKERS ( 2017 05:58 )  <0.015 ng/mL / x     / x     / x     / <1.0 ng/mL  CARDIAC MARKERS ( 2017 03:00 )  <0.015 ng/mL / x     / 135 U/L / x     / x      CARDIAC MARKERS ( 2017 20:20 )  <0.015 ng/mL / x     / 140 U/L / x     / <1.0 ng/mL  CARDIAC MARKERS ( 2017 14:44 )  <0.015 ng/mL / x     / 154 U/L / x     / 1.1 ng/mL        Serum Pro-Brain Natriuretic Peptide: 42 pg/mL (17 @ 14:44)          MICROBIOLOGY: (if applicable)    RADIOLOGY & ADDITIONAL STUDIES:  EKG:   CXR:  ECHO:  Time of Visit:  Patient seen and examined.     MEDICATIONS  (STANDING):  nicotine -  14 mG/24Hr(s) Patch 1 patch Transdermal daily  aspirin enteric coated 81 milliGRAM(s) Oral daily  atorvastatin 40 milliGRAM(s) Oral at bedtime  metoprolol 25 milliGRAM(s) Oral two times a day  pantoprazole    Tablet 40 milliGRAM(s) Oral before breakfast  sodium chloride 0.9%. 1000 milliLiter(s) (75 mL/Hr) IV Continuous <Continuous>  cholecalciferol 1000 Unit(s) Oral daily      MEDICATIONS  (PRN):  acetaminophen   Tablet. 650 milliGRAM(s) Oral every 6 hours PRN Mild Pain (1 - 3)  traMADol 25 milliGRAM(s) Oral three times a day PRN Moderate Pain (4 - 6)     Medications up to date at time of exam.    PHYSICAL EXAMINATION:  Patient has no new complaints.  GENERAL: The patient is a well-developed, well-nourished, in no apparent distress.     Vital Signs Last 24 Hrs  T(C): 36.8 (2017 11:11), Max: 37.3 (2017 20:39)  T(F): 98.2 (2017 11:11), Max: 99.1 (2017 20:39)  HR: 82 (2017 11:11) (73 - 89)  BP: 115/75 (2017 11:11) (104/68 - 122/76)  BP(mean): --  RR: 18 (2017 11:11) (18 - 18)  SpO2: 98% (2017 11:11) (98% - 100%)   (if applicable)    Chest Tube (if applicable)    HEENT: Head is normocephalic and atraumatic.     NECK: Supple, no palpable adenopathy.    LUNGS: Clear to auscultation, no wheezing, rales, or rhonchi.    HEART: Regular rate and rhythm without murmur.    ABDOMEN: Soft, nontender, and nondistended.      EXTREMITIES: Without any cyanosis, clubbing, rash, lesions or edema.    NEUROLOGIC: Awake, alert.    SKIN: Warm, dry, good turgor.    LABS:                        16.7   9.0   )-----------( 231      ( 2017 07:44 )             50.5     07-24    142  |  106  |  10  ----------------------------<  88  3.9   |  28  |  0.98    Ca    8.6      2017 07:44  Phos  2.9     07-24  Mg     2.1     07-24    TPro  7.1  /  Alb  3.6  /  TBili  0.5  /  DBili  x   /  AST  24  /  ALT  33  /  AlkPhos  89  07-22    PT/INR - ( 2017 14:44 )   PT: 10.0 sec;   INR: 0.92 ratio         PTT - ( 2017 14:44 )  PTT:29.3 sec  Urinalysis Basic - ( 2017 15:26 )    Color: Yellow / Appearance: Clear / S.015 / pH: x  Gluc: x / Ketone: Negative  / Bili: Negative / Urobili: Negative   Blood: x / Protein: Negative / Nitrite: Negative   Leuk Esterase: Negative / RBC: x / WBC x   Sq Epi: x / Non Sq Epi: x / Bacteria: x        CARDIAC MARKERS ( 2017 05:58 )  <0.015 ng/mL / x     / x     / x     / <1.0 ng/mL  CARDIAC MARKERS ( 2017 03:00 )  <0.015 ng/mL / x     / 135 U/L / x     / x      CARDIAC MARKERS ( 2017 20:20 )  <0.015 ng/mL / x     / 140 U/L / x     / <1.0 ng/mL  CARDIAC MARKERS ( 2017 14:44 )  <0.015 ng/mL / x     / 154 U/L / x     / 1.1 ng/mL        Serum Pro-Brain Natriuretic Peptide: 42 pg/mL (17 @ 14:44)          MICROBIOLOGY: (if applicable)    RADIOLOGY & ADDITIONAL STUDIES:  EKG:   CXR:  ECHO:  < from: Transthoracic Echocardiogram (17 @ 07:17) >  CONCLUSIONS:  1. Normal mitral valve.  2. Normal trileaflet aortic valve.  3. Normal aortic root.  4. Normal left atrium.  5. Normal left ventricular internal dimensions and wall  thicknesses.  6. Normal Left Ventricular Systolic Function,  (EF = 55 to  60%)  7. Grade I diastolic dysfunction  8. Normal right atrium.  9. Normal right ventricular size and function.  10. RVS Pressure is 24 mm Hg.  11. Normal tricuspid valve.  12. Normal pulmonic valve.  13. Normal pericardium with no pericardial effusion.    < end of copied text >    IMPRESSION: 47y Male PAST MEDICAL & SURGICAL HISTORY:  No pertinent past medical history  H/O vasectomy     STRESS TEST:   < from: Nuclear Stress Test-Exercise (17 @ 08:47) >  IMPRESSIONS:Normal Study  * Exercise capacity:10 METS, Fair for age and gender.  * Chest Pain: No chest pain with exercise.  * HR Response: Appropriate.  * BP Response: Appropriate.  * Negative ECG evidence of ischemia during exercise stress  test.  * Myocardial Perfusion SPECT results are normal at 98 % of  MPHR.  * No clear evidence of ischemia or infarct.  * Post-stress resting myocardial perfusion gated SPECT  imaging was performed (LVEF = 60 %) with no regional wall  motion abnormalities.    < end of copied text >    p/w     RECOMMENDATIONS:      IMPRESSION: 47y Male PAST MEDICAL & SURGICAL HISTORY:  No pertinent past medical history  H/O vasectomy       p/w     47 y.o. male from home, lives with wife and daughter, has never seen a physician in the past and has no PNHx, PSHx of vasectomy, c/o on & off Lt sided chest pressure since yesterday, also "pinching" sensation non radiating, no associated symptoms. Patient describes the pain as pressure like, left sided, with ocassional pinching sensation, intermittent, with no exacerbating or relieving factors, pain happens at rest too, even while watching TV. 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 He is an active smoker and has second smoke exposure from spouse and wife. Wife also describe witnessed  apneic episodes with snoring, He has symptoms of sleep apnea    RECOMMENDATIONS:     - echo and ST results as above   - cigarette cessation discussed with patient.    - out patient PFT   - out pt pulm f/u   - DVT and GI prophylaxis.   Agree with above assessment and plan as transcribed.

## 2017-07-24 NOTE — DISCHARGE NOTE ADULT - PLAN OF CARE
Relieved Probably it is Musculoskeletal, as cardiac cause is ruled out. Your Stress Test and Echo of heart is normal. Please follow up with Dr. Stein Cardiologist in 2 weeks. Take aspirin 81mg once daily. As discussed please quit smoking. Use Nicotine patch 1 transdermal patch when you get smoking craving. Please take atorvastatin 40mg oral at bedtime. Please take atorvastatin 40mg oral at bedtime. Adhere to low cholesterol diet as your Triglycerides are high

## 2017-07-24 NOTE — DISCHARGE NOTE ADULT - CARE PROVIDER_API CALL
Christopher Stein (MD), Cardiovascular Disease  [address] [facility] [address]  [place] [address]  Phone: [phone]  Fax: [phone]

## 2017-07-24 NOTE — DISCHARGE NOTE ADULT - HOSPITAL COURSE
History of Present Illness: 	  47 y.o. male from home, lives with wife and daughter, has never seen a physician in the past and has no PNHx, PSHx of vasectomy, c/o on & off Lt sided chest pressure since yesterday, also "pinching" sensation non radiating, no associated symptoms. patient describes the pain as pressure like, left sided, with ocassional pinching sensation, intermittent, with no exacerbating or relieving factors, pain happens at rest too, even while watching TV. 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 However patient admits that the pain is sometimes worse while stretching his upper body or deep inspiration, denies association with food, denies any symptoms of GERD. Says that he has been unable to sleep adequately for a past few months, however denies any stress at home or work.   Chest pain: 47 y.o. male from home, lives with wife and daughter, has never seen a physician in the past and has no PNHx, PSHx of vasectomy, c/o on & off Lt sided chest pressure since yesterday, also "pinching" sensation non radiating, no associated symptoms. patient describes the pain as pressure like, left sided, with ocassional pinching sensation, intermittent, with no exacerbating or relieving factors, pain happens at rest too, even while watching TV. 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 However patient admits that the pain is sometimes worse while stretching his upper body or deep inspiration, denies association with food, denies any symptoms of GERD. Says that he has been unable to sleep adequately for a past few months, however denies any stress at home or work.   Chest pain:  Likely  MSK pain. ACS was ruled out, Dr Stein ( Cardiologist) was  consulted, EKG: NST-Twave changes, Troponins x3 negative. Transthoracic Echocardiogram (07.23.17 @ 07:17) > Normal mitral valve. . Normal trileaflet aortic valve.. Normal aortic root.. Normal left atrium.. Normal left ventricular internal dimensions and wall thicknesses. Normal Left Ventricular Systolic Function,  (EF = 55 to60%)  Grade I diastolic dysfunction  Normal right atrium.  Normal right ventricular size and function.  RVS Pressure is 24 mm Hg.. Normal tricuspid valve.. Normal pulmonic valve.. Normal pericardium with no pericardial effusion. Stress EKG < from: Nuclear Stress Test-Exercise (07.24.17 @ 08:47) > Exercise capacity:10 METS, Fair for age and gender.  Chest Pain: No chest pain with exercise.  HR Response: Appropriate. BP Response: Appropriate.  Negative ECG evidence of ischemia during exercise stress test.  Myocardial Perfusion SPECT results are normal at 98 % of MPHR.  No clear evidence of ischemia or infarct.  Post-stress resting myocardial perfusion gated SPECT imaging was performed (LVEF = 60 %) with no regional wall motion abnormalities.  As per Dr. Stein patient will go for follow up in 2 weeks  [employer] use disorder: Patient is counselled to quit smoking. Nictotine patch was prescribed. Patient will be sent home on Nicotine patch.    Discussed with attending,  Patient is stable for discharge.

## 2017-07-26 DIAGNOSIS — E78.5 HYPERLIPIDEMIA, UNSPECIFIED: ICD-10-CM

## 2017-07-26 DIAGNOSIS — R07.89 OTHER CHEST PAIN: ICD-10-CM

## 2017-07-26 DIAGNOSIS — F17.210 NICOTINE DEPENDENCE, CIGARETTES, UNCOMPLICATED: ICD-10-CM

## 2022-03-29 NOTE — ED ADULT NURSE NOTE - CAS EDN DISCHARGE ASSESSMENT
Alert and oriented to person, place and time Arava Counseling:  Patient counseled regarding adverse effects of Arava including but not limited to nausea, vomiting, abnormalities in liver function tests. Patients may develop mouth sores, rash, diarrhea, and abnormalities in blood counts. The patient understands that monitoring is required including LFTs and blood counts.  There is a rare possibility of scarring of the liver and lung problems that can occur when taking methotrexate. Persistent nausea, loss of appetite, pale stools, dark urine, cough, and shortness of breath should be reported immediately. Patient advised to discontinue Arava treatment and consult with a physician prior to attempting conception. The patient will have to undergo a treatment to eliminate Arava from the body prior to conception.

## 2024-05-07 NOTE — ED PROVIDER NOTE - SKIN, MLM
May 21, 2024      Lucia Kooasley  [address]  [place] [address]        Dear ,    We are writing to inform you of your test results.    Your creatinine is staying relatively stable, but we did see that you are leaking a bit more protein into your urine. This indicates that your kidneys may be undergoing a little damage either from high blood pressure or diabetes. Your A1C overall looks good, so I am more inclined to think the blood pressure may be contributing. You may benefit from a blood pressure medication that can offer protection to your kidneys. I would like to see you back with one of my colleagues in the next 2-4 weeks to talk about that if you have time.     Resulted Orders   HEMOGLOBIN A1C   Result Value Ref Range    Hemoglobin A1C 6.0 (H) 0.0 - 5.6 %      Comment:      Normal <5.7%   Prediabetes 5.7-6.4%    Diabetes 6.5% or higher     Note: Adopted from ADA consensus guidelines.   Basic metabolic panel   Result Value Ref Range    Sodium 140 135 - 145 mmol/L      Comment:      Reference intervals for this test were updated on 09/26/2023 to more accurately reflect our healthy population. There may be differences in the flagging of prior results with similar values performed with this method. Interpretation of those prior results can be made in the context of the updated reference intervals.     Potassium 4.4 3.4 - 5.3 mmol/L    Chloride 103 98 - 107 mmol/L    Carbon Dioxide (CO2) 26 22 - 29 mmol/L    Anion Gap 11 7 - 15 mmol/L    Urea Nitrogen 12.3 8.0 - 23.0 mg/dL    Creatinine 1.01 (H) 0.51 - 0.95 mg/dL    GFR Estimate 59 (L) >60 mL/min/1.73m2    Calcium 9.8 8.8 - 10.2 mg/dL    Glucose 119 (H) 70 - 99 mg/dL   Albumin Random Urine Quantitative with Creat Ratio   Result Value Ref Range    Creatinine Urine mg/dL 167.0 mg/dL      Comment:      The reference ranges have not been established in urine creatinine. The results should be integrated into the clinical context for  interpretation.    Albumin Urine mg/L 151.0 mg/L      Comment:      The reference ranges have not been established in urine albumin. The results should be integrated into the clinical context for interpretation.    Albumin Urine mg/g Cr 90.42 (H) 0.00 - 25.00 mg/g Cr      Comment:      Microalbuminuria is defined as an albumin:creatinine ratio of 17 to 299 for males and 25 to 299 for females. A ratio of albumin:creatinine of 300 or higher is indicative of overt proteinuria.  Due to biologic variability, positive results should be confirmed by a second, first-morning random or 24-hour timed urine specimen. If there is discrepancy, a third specimen is recommended. When 2 out of 3 results are in the microalbuminuria range, this is evidence for incipient nephropathy and warrants increased efforts at glucose control, blood pressure control, and institution of therapy with an angiotensin-converting-enzyme (ACE) inhibitor (if the patient can tolerate it).         If you have any questions or concerns, please call the clinic at the number listed above.       Sincerely,      Shandra Werner MD             Skin normal color for race, warm, dry and intact. No evidence of rash.